# Patient Record
Sex: MALE | Race: WHITE | NOT HISPANIC OR LATINO | Employment: FULL TIME | URBAN - METROPOLITAN AREA
[De-identification: names, ages, dates, MRNs, and addresses within clinical notes are randomized per-mention and may not be internally consistent; named-entity substitution may affect disease eponyms.]

---

## 2019-11-03 ENCOUNTER — HOSPITAL ENCOUNTER (EMERGENCY)
Facility: HOSPITAL | Age: 24
Discharge: HOME | End: 2019-11-03
Attending: EMERGENCY MEDICINE | Admitting: EMERGENCY MEDICINE
Payer: COMMERCIAL

## 2019-11-03 VITALS
SYSTOLIC BLOOD PRESSURE: 126 MMHG | OXYGEN SATURATION: 100 % | HEIGHT: 72 IN | RESPIRATION RATE: 18 BRPM | HEART RATE: 51 BPM | TEMPERATURE: 97.8 F | WEIGHT: 155 LBS | BODY MASS INDEX: 20.99 KG/M2 | DIASTOLIC BLOOD PRESSURE: 65 MMHG

## 2019-11-03 DIAGNOSIS — R55 SYNCOPE: Primary | ICD-10-CM

## 2019-11-03 PROCEDURE — 99282 EMERGENCY DEPT VISIT SF MDM: CPT | Performed by: EMERGENCY MEDICINE

## 2019-11-03 PROCEDURE — 99284 EMERGENCY DEPT VISIT MOD MDM: CPT

## 2019-11-03 NOTE — ED TRIAGE NOTES
EMS stated,  Patient was picked up by [employer] for DUI and was having blood drawn  EMS stated patient became diaphoretic and passed out  Patient states he gets that was after giving blood  Patient arrived alert and oriented X 4  No distress noted

## 2019-11-03 NOTE — ED PROVIDER NOTES
History  Chief Complaint   Patient presents with    Syncope     Passed out after giving blood  69-year-old male presents for evaluation of near syncope  Patient was giving a blood sample for police as he was pulled over for possible DUI  He was sitting in a chair and immediately after blood was drawn, he started to feel very lightheaded  Patient does not believe he actually lost consciousness but had a few minutes of feeling very lightheaded  He is now completely asymptomatic  Denies preceding chest pain, shortness of breath, nausea, vomiting  He is alert and oriented x3  None       History reviewed  No pertinent past medical history  History reviewed  No pertinent surgical history  History reviewed  No pertinent family history  I have reviewed and agree with the history as documented  Social History     Tobacco Use    Smoking status: Current Every Day Smoker     Packs/day: 0 50     Types: Cigarettes    Smokeless tobacco: Never Used   Substance Use Topics    Alcohol use: Yes     Comment: occasionally    Drug use: Never        Review of Systems   Constitutional: Negative for chills, diaphoresis and fever  HENT: Negative for congestion and rhinorrhea  Eyes: Negative for pain and visual disturbance  Respiratory: Negative for cough, shortness of breath and wheezing  Cardiovascular: Negative for chest pain and leg swelling  Gastrointestinal: Negative for abdominal pain, diarrhea, nausea and vomiting  Genitourinary: Negative for difficulty urinating, dysuria, frequency and urgency  Musculoskeletal: Negative for back pain and neck pain  Skin: Negative for color change and rash  Neurological: Positive for syncope and light-headedness  Negative for numbness and headaches  All other systems reviewed and are negative  Physical Exam  Physical Exam   Constitutional: He is oriented to person, place, and time  He appears well-developed and well-nourished  No distress  Patient refused physical exam   Musculoskeletal:   Able to ambulate without difficulty   Neurological: He is alert and oriented to person, place, and time  Nursing note and vitals reviewed  Vital Signs  ED Triage Vitals [11/03/19 0559]   Temperature Pulse Respirations Blood Pressure SpO2   97 8 °F (36 6 °C) (!) 51 18 126/65 100 %      Temp Source Heart Rate Source Patient Position - Orthostatic VS BP Location FiO2 (%)   Tympanic Monitor Sitting Left arm --      Pain Score       No Pain           Vitals:    11/03/19 0559   BP: 126/65   Pulse: (!) 51   Patient Position - Orthostatic VS: Sitting         Visual Acuity      ED Medications  Medications - No data to display    Diagnostic Studies  Results Reviewed     None                 No orders to display              Procedures  Procedures       ED Course                               MDM  Number of Diagnoses or Management Options  Syncope:   Diagnosis management comments: 18-year-old male presenting with likely vasovagal syncope  Initially ordered EKG and blood sugar but patient is refusing  Discussed various possible life-threatening causes of syncope that would be diagnosed on EKG  Patient able to verbally express that he understands these life-threatening causes and is still declining  Patient is calm and cooperative  Able to answer all questions  Refused physical exam and would like to be discharged  Disposition  Final diagnoses:   Syncope     Time reflects when diagnosis was documented in both MDM as applicable and the Disposition within this note     Time User Action Codes Description Comment    11/3/2019  6:00 AM Live Shuttle Add [R55] Syncope       ED Disposition     ED Disposition Condition Date/Time Comment    Discharge Stable Sun Nov 3, 2019  6:00 AM Jessi Palmer discharge to home/self care              Follow-up Information     Follow up With Specialties Details Why Contact Info Additional [address]   [address], [address] [facility] [address] [place], [address], [address] [place], [place], [address] [facility] Emergency [facility]  If symptoms worsen [address] [id number] [phone]           There are no discharge medications for this patient  No discharge procedures on file      ED Provider  Electronically Signed by           Corinne Cordia, DO  11/03/19 411 ARTEM Kennedy DO  11/03/19 7289

## 2020-03-30 ENCOUNTER — TELEPHONE (OUTPATIENT)
Dept: FAMILY MEDICINE CLINIC | Facility: CLINIC | Age: 25
End: 2020-03-30

## 2020-03-30 NOTE — TELEPHONE ENCOUNTER
COVID-19 Phone Call Triage    Luana Marroquin called stating that they are having Fever, Fatigue, Nasal Congestion, Vomiting, nausea, Body Aches and Chills  Luana Marroquin has not traveled outside the [place]  within the last 14 days  nor outside the [facility]  Luana Marroquin has not been around any one ill or exposed to COVID-19  This is not our pt however the father is and they want to been seen  Per braulio I retreive All pt info  Please call patient to triage

## 2020-03-30 NOTE — TELEPHONE ENCOUNTER
I spoke to the father  He does have a slip from his other son who is a physician and temple  He will get him tested